# Patient Record
Sex: FEMALE | Race: WHITE | NOT HISPANIC OR LATINO | Employment: OTHER | URBAN - METROPOLITAN AREA
[De-identification: names, ages, dates, MRNs, and addresses within clinical notes are randomized per-mention and may not be internally consistent; named-entity substitution may affect disease eponyms.]

---

## 2019-01-29 ENCOUNTER — TELEPHONE (OUTPATIENT)
Dept: RHEUMATOLOGY | Facility: CLINIC | Age: 74
End: 2019-01-29

## 2019-01-29 NOTE — TELEPHONE ENCOUNTER
Patient's daughter to have Dr. Thompson's office to fax referral. Will call patient's daughter to schedule once received.

## 2019-01-29 NOTE — TELEPHONE ENCOUNTER
----- Message from Elaina Gann sent at 1/29/2019  3:28 PM CST -----  Contact: daughter  Type: Needs Medical Advice    Who Called: Estee-daughter  Best Call Back Number: [phone]  Additional Information: Pt would like to be booked for an appt she has a referral from Dr Thompson's office pt has pain in her back and leg.

## 2019-10-18 ENCOUNTER — TELEPHONE (OUTPATIENT)
Dept: RHEUMATOLOGY | Facility: CLINIC | Age: 74
End: 2019-10-18

## 2019-10-18 NOTE — TELEPHONE ENCOUNTER
Spoke with Estee and scheduled appointment with Dr. Woods for 12.6.19 at 2.30 pm in [place]. Verbalized understanding

## 2019-10-18 NOTE — TELEPHONE ENCOUNTER
----- Message from Aleksandr Mcmahan sent at 10/18/2019  3:47 PM CDT -----  Contact: pt daughter Esete  Type:  New Patient Appointment Request    Caller is requesting a New Patient appointment.     Name of Caller: Estee    Reason for seeing the Provider: diagnosed with Fibromyalgia, arthritis  Does the pt have a referral: no  Best Call Back Number:  [phone]  Additional Information:

## 2020-01-20 PROBLEM — K85.10 ACUTE BILIARY PANCREATITIS: Status: ACTIVE | Noted: 2020-01-20

## 2020-01-20 PROBLEM — E03.9 HYPOTHYROID: Status: ACTIVE | Noted: 2020-01-20

## 2020-01-20 PROBLEM — K81.0 ACUTE CHOLECYSTITIS: Status: ACTIVE | Noted: 2020-01-20

## 2020-01-20 PROBLEM — K85.10 GALL STONE PANCREATITIS: Status: ACTIVE | Noted: 2020-01-20

## 2020-01-27 PROBLEM — J96.01 ACUTE RESPIRATORY FAILURE WITH HYPOXIA: Status: ACTIVE | Noted: 2020-01-27

## 2022-01-31 ENCOUNTER — OFFICE VISIT (OUTPATIENT)
Dept: OPTOMETRY | Facility: CLINIC | Age: 77
End: 2022-01-31
Payer: MEDICARE

## 2022-01-31 DIAGNOSIS — H01.02A SQUAMOUS BLEPHARITIS OF UPPER AND LOWER EYELIDS OF BOTH EYES: Primary | ICD-10-CM

## 2022-01-31 DIAGNOSIS — H01.02B SQUAMOUS BLEPHARITIS OF UPPER AND LOWER EYELIDS OF BOTH EYES: Primary | ICD-10-CM

## 2022-01-31 PROCEDURE — 1160F PR REVIEW ALL MEDS BY PRESCRIBER/CLIN PHARMACIST DOCUMENTED: ICD-10-PCS | Mod: CPTII,S$GLB,, | Performed by: OPTOMETRIST

## 2022-01-31 PROCEDURE — 1159F MED LIST DOCD IN RCRD: CPT | Mod: CPTII,S$GLB,, | Performed by: OPTOMETRIST

## 2022-01-31 PROCEDURE — 1126F PR PAIN SEVERITY QUANTIFIED, NO PAIN PRESENT: ICD-10-PCS | Mod: CPTII,S$GLB,, | Performed by: OPTOMETRIST

## 2022-01-31 PROCEDURE — 92002 INTRM OPH EXAM NEW PATIENT: CPT | Mod: S$GLB,,, | Performed by: OPTOMETRIST

## 2022-01-31 PROCEDURE — 1126F AMNT PAIN NOTED NONE PRSNT: CPT | Mod: CPTII,S$GLB,, | Performed by: OPTOMETRIST

## 2022-01-31 PROCEDURE — 99999 PR PBB SHADOW E&M-EST. PATIENT-LVL II: ICD-10-PCS | Mod: PBBFAC,,, | Performed by: OPTOMETRIST

## 2022-01-31 PROCEDURE — 1159F PR MEDICATION LIST DOCUMENTED IN MEDICAL RECORD: ICD-10-PCS | Mod: CPTII,S$GLB,, | Performed by: OPTOMETRIST

## 2022-01-31 PROCEDURE — 99999 PR PBB SHADOW E&M-EST. PATIENT-LVL II: CPT | Mod: PBBFAC,,, | Performed by: OPTOMETRIST

## 2022-01-31 PROCEDURE — 1101F PT FALLS ASSESS-DOCD LE1/YR: CPT | Mod: CPTII,S$GLB,, | Performed by: OPTOMETRIST

## 2022-01-31 PROCEDURE — 92002 PR EYE EXAM, NEW PATIENT,INTERMED: ICD-10-PCS | Mod: S$GLB,,, | Performed by: OPTOMETRIST

## 2022-01-31 PROCEDURE — 3288F FALL RISK ASSESSMENT DOCD: CPT | Mod: CPTII,S$GLB,, | Performed by: OPTOMETRIST

## 2022-01-31 PROCEDURE — 1101F PR PT FALLS ASSESS DOC 0-1 FALLS W/OUT INJ PAST YR: ICD-10-PCS | Mod: CPTII,S$GLB,, | Performed by: OPTOMETRIST

## 2022-01-31 PROCEDURE — 3288F PR FALLS RISK ASSESSMENT DOCUMENTED: ICD-10-PCS | Mod: CPTII,S$GLB,, | Performed by: OPTOMETRIST

## 2022-01-31 PROCEDURE — 1160F RVW MEDS BY RX/DR IN RCRD: CPT | Mod: CPTII,S$GLB,, | Performed by: OPTOMETRIST

## 2022-01-31 RX ORDER — TOBRAMYCIN 3 MG/ML
1 SOLUTION/ DROPS OPHTHALMIC 4 TIMES DAILY
Qty: 5 ML | Refills: 0 | Status: SHIPPED | OUTPATIENT
Start: 2022-01-31 | End: 2022-02-21

## 2022-01-31 NOTE — PROGRESS NOTES
"HPI     NP/Last eye exam was 2 yrs ago "at some little place in [place]"    + green discharge OU x's 8 months "all during the day" that pt. States   matts up in eyelashes.  + "skim or something I want to wipe off my eyes all the time that I dont   know what it is or how to explain."  Saw Dr. Hudson w/Dr. Thompson office about 6 months ago and they prescribed some   gtts, but pt. Does not know name of them and states she quit using them 3   months ago.  "green discharge has improved some since the end of December."    Denies itchy, pain, redness, or swelling.  Denies any flashes or floaters.    OTC tears BID OU (different brands)    Last edited by Yumiko Cross on 1/31/2022  8:48 AM. (History)            Assessment /Plan     For exam results, see Encounter Report.    Squamous blepharitis of upper and lower eyelids of both eyes  -     tobramycin sulfate 0.3% (TOBREX) 0.3 % ophthalmic solution; Place 1 drop into both eyes 4 (four) times daily. for 7 days  Dispense: 5 mL; Refill: 0      1. Discussed chronicity, start ocusoft lid wipes daily and tobrex drops 4x/day x 1 week, RTC or call if no better in 1 week.                  "

## 2022-10-11 NOTE — PROGRESS NOTES
Assessment /Plan     For exam results, see Encounter Report.    There are no diagnoses linked to this encounter.    Sent in tobrex drop refill per pt request

## 2022-12-27 ENCOUNTER — TELEPHONE (OUTPATIENT)
Dept: OPTOMETRY | Facility: CLINIC | Age: 77
End: 2022-12-27
Payer: MEDICARE

## 2022-12-27 NOTE — TELEPHONE ENCOUNTER
Spoke with pt. Who states FBS OS started yesterday with mild irritation. No changes in VA, flashes, or floaters. Next available appt is 1/3/23 for [facility]. Advised pt. And she sates she wants that appt. Advised pt. If symptoms worsen or new symptoms develop to got to ER.     ----- Message from Yareli Guevara sent at 12/27/2022 10:49 AM CST -----  Contact: [phone]  Type:  Same Day Appointment Request    Caller is requesting a same day appointment.  Caller declined first available appointment listed below.      Name of Caller:  Pt   When is the first available appointment?  Feb   Symptoms:  Feels like something in her left eye   Best Call Back Number:  [phone] (home)   Additional Information:   Pls call back and advise

## 2023-01-03 ENCOUNTER — OFFICE VISIT (OUTPATIENT)
Dept: OPTOMETRY | Facility: CLINIC | Age: 78
End: 2023-01-03
Payer: MEDICARE

## 2023-01-03 DIAGNOSIS — H35.3131 EARLY DRY STAGE NONEXUDATIVE AGE-RELATED MACULAR DEGENERATION OF BOTH EYES: ICD-10-CM

## 2023-01-03 DIAGNOSIS — H01.02B SQUAMOUS BLEPHARITIS OF UPPER AND LOWER EYELIDS OF BOTH EYES: Primary | ICD-10-CM

## 2023-01-03 DIAGNOSIS — H52.4 HYPEROPIA WITH ASTIGMATISM AND PRESBYOPIA, BILATERAL: ICD-10-CM

## 2023-01-03 DIAGNOSIS — H52.203 HYPEROPIA WITH ASTIGMATISM AND PRESBYOPIA, BILATERAL: ICD-10-CM

## 2023-01-03 DIAGNOSIS — H02.889 DRY EYE SYNDROME DUE TO MEIBOMIAN GLAND DYSFUNCTION: ICD-10-CM

## 2023-01-03 DIAGNOSIS — H25.813 COMBINED FORMS OF AGE-RELATED CATARACT, BILATERAL: ICD-10-CM

## 2023-01-03 DIAGNOSIS — H04.129 DRY EYE SYNDROME DUE TO MEIBOMIAN GLAND DYSFUNCTION: ICD-10-CM

## 2023-01-03 DIAGNOSIS — H52.03 HYPEROPIA WITH ASTIGMATISM AND PRESBYOPIA, BILATERAL: ICD-10-CM

## 2023-01-03 DIAGNOSIS — Z13.5 GLAUCOMA SCREENING: ICD-10-CM

## 2023-01-03 DIAGNOSIS — H01.02A SQUAMOUS BLEPHARITIS OF UPPER AND LOWER EYELIDS OF BOTH EYES: Primary | ICD-10-CM

## 2023-01-03 PROCEDURE — 1101F PR PT FALLS ASSESS DOC 0-1 FALLS W/OUT INJ PAST YR: ICD-10-PCS | Mod: CPTII,S$GLB,,

## 2023-01-03 PROCEDURE — 1159F MED LIST DOCD IN RCRD: CPT | Mod: CPTII,S$GLB,,

## 2023-01-03 PROCEDURE — 92134 PR COMPUTERIZED OPHTHALMIC IMAGING RETINA: ICD-10-PCS | Mod: S$GLB,,,

## 2023-01-03 PROCEDURE — 1126F PR PAIN SEVERITY QUANTIFIED, NO PAIN PRESENT: ICD-10-PCS | Mod: CPTII,S$GLB,,

## 2023-01-03 PROCEDURE — 3288F PR FALLS RISK ASSESSMENT DOCUMENTED: ICD-10-PCS | Mod: CPTII,S$GLB,,

## 2023-01-03 PROCEDURE — 1159F PR MEDICATION LIST DOCUMENTED IN MEDICAL RECORD: ICD-10-PCS | Mod: CPTII,S$GLB,,

## 2023-01-03 PROCEDURE — 3288F FALL RISK ASSESSMENT DOCD: CPT | Mod: CPTII,S$GLB,,

## 2023-01-03 PROCEDURE — 99999 PR PBB SHADOW E&M-EST. PATIENT-LVL III: CPT | Mod: PBBFAC,,,

## 2023-01-03 PROCEDURE — 1126F AMNT PAIN NOTED NONE PRSNT: CPT | Mod: CPTII,S$GLB,,

## 2023-01-03 PROCEDURE — 1101F PT FALLS ASSESS-DOCD LE1/YR: CPT | Mod: CPTII,S$GLB,,

## 2023-01-03 PROCEDURE — 92014 COMPRE OPH EXAM EST PT 1/>: CPT | Mod: S$GLB,,,

## 2023-01-03 PROCEDURE — 92014 PR EYE EXAM, EST PATIENT,COMPREHESV: ICD-10-PCS | Mod: S$GLB,,,

## 2023-01-03 PROCEDURE — 92134 CPTRZ OPH DX IMG PST SGM RTA: CPT | Mod: S$GLB,,,

## 2023-01-03 PROCEDURE — 99999 PR PBB SHADOW E&M-EST. PATIENT-LVL III: ICD-10-PCS | Mod: PBBFAC,,,

## 2023-01-03 RX ORDER — METOPROLOL TARTRATE 25 MG/1
25 TABLET, FILM COATED ORAL DAILY
COMMUNITY
Start: 2022-12-05

## 2023-01-03 RX ORDER — NEOMYCIN SULFATE, POLYMYXIN B SULFATE, AND DEXAMETHASONE 3.5; 10000; 1 MG/G; [USP'U]/G; MG/G
OINTMENT OPHTHALMIC 4 TIMES DAILY
Qty: 3.5 G | Refills: 1 | Status: SHIPPED | OUTPATIENT
Start: 2023-01-03 | End: 2023-01-03 | Stop reason: SDUPTHER

## 2023-01-03 RX ORDER — NEOMYCIN SULFATE, POLYMYXIN B SULFATE AND DEXAMETHASONE 3.5; 10000; 1 MG/ML; [USP'U]/ML; MG/ML
1 SUSPENSION/ DROPS OPHTHALMIC 4 TIMES DAILY
Qty: 5 ML | Refills: 1 | Status: SHIPPED | OUTPATIENT
Start: 2023-01-03 | End: 2023-01-13

## 2023-01-03 RX ORDER — LOSARTAN POTASSIUM 50 MG/1
50 TABLET ORAL DAILY
COMMUNITY
Start: 2022-11-09

## 2023-01-03 RX ORDER — CLOPIDOGREL BISULFATE 75 MG/1
75 TABLET ORAL DAILY
COMMUNITY
Start: 2022-12-23

## 2023-01-03 RX ORDER — ROSUVASTATIN CALCIUM 5 MG/1
5 TABLET, COATED ORAL NIGHTLY
COMMUNITY
Start: 2022-12-05

## 2023-01-03 NOTE — PROGRESS NOTES
HPI    New pt here for eye exam and blepharitis f/u.     Pt sts last Tuesday she got something in eye, skin she said. Pt sts this   happens daily and pt does not know what to do anymore. Pt using lid wipes   and Tobramycin BID OU with only slight relief. Pt sts VA OU is stable. Pt   denies flashes/floaters.  Last edited by Yumiko Henry on 1/3/2023  9:10 AM.        ROS    Positive for: Eyes  Negative for: Constitutional, Gastrointestinal, Neurological, Skin,   Genitourinary, Musculoskeletal, HENT, Endocrine, Cardiovascular,   Respiratory, Psychiatric, Allergic/Imm, Heme/Lymph  Last edited by William Morgan, OD on 1/3/2023  9:33 AM.        Assessment /Plan     For exam results, see Encounter Report.    Squamous blepharitis of upper and lower eyelids of both eyes  -     neomycin-polymyxin-dexamethasone (MAXITROL) 3.5 mg/g-10,000 unit/g-0.1 % Oint; Place into both eyes 4 (four) times daily. 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  Longstanding problem for pt. Madarosis, blepharitis, and 2+ SPK OU. Pt currently using Tobramycin gtts BID OU and lid scrubs qday. Recommended pt discontinue Tobramycin and begin Maxitrol gtts QID OU x 10 days. Advised pt to then begin artificial tears QID along with gel gtt at bedtime, continue lid scrubs daily. Recommended warm compresses with digital massage. Ed pt and pt's grandson to call or message if no improvement or worsening of symptoms. Wanted to rx Maxitrol luisana but pt reports difficulties with ointments, prefers not to use.    3. 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 Strong family history per pt - pt already taking an AREDS similar.  Dispensed [employer] for at-home monitoring and instructed on proper use. Baseline mac OCT obtained today. Ok to monitor yearly, sooner if pt symptomatic/noticing changes in grid.    4. Ed pt that cataracts likely contributing to reduced BCVA, cautioned against driving at night. Discussed surgical option, pt defers for now. Monitor yearly for changes.    5. Small CD, IOP WNL. Not suspect. Monitor yearly for changes.    6. No improvement upon refraction today. Ed pt that BCVA reduced due to cataracts and extensive dryness on front surface. Renewed pt's current spec rx and gave copy. [employer], ed pt and grandson to call around to see which optical will accept insurance.    RTC: 1 year for comprehensive exam or sooner prn

## 2024-02-09 PROBLEM — R79.89 ELEVATED TROPONIN: Status: ACTIVE | Noted: 2024-02-09

## 2024-02-09 PROBLEM — M54.2 NECK PAIN WITHOUT INJURY: Status: ACTIVE | Noted: 2024-02-09

## 2024-02-09 PROBLEM — Z71.89 ADVANCE CARE PLANNING: Status: ACTIVE | Noted: 2024-02-09

## 2024-02-09 PROBLEM — M27.62: Status: ACTIVE | Noted: 2024-02-09

## 2024-02-09 PROBLEM — R42 DIZZINESS: Status: ACTIVE | Noted: 2024-02-09

## 2024-02-09 PROBLEM — R11.0 NAUSEA: Status: ACTIVE | Noted: 2024-02-09

## 2024-02-09 PROBLEM — I10 BENIGN ESSENTIAL HYPERTENSION: Status: ACTIVE | Noted: 2024-02-09

## 2024-02-10 PROBLEM — E87.5 HYPERKALEMIA: Status: ACTIVE | Noted: 2024-02-10

## 2024-02-10 PROBLEM — I25.10 CORONARY ARTERY DISEASE: Status: ACTIVE | Noted: 2024-02-10

## 2024-02-11 PROBLEM — J96.11 CHRONIC RESPIRATORY FAILURE WITH HYPOXIA AND HYPERCAPNIA: Status: ACTIVE | Noted: 2020-01-27

## 2024-02-11 PROBLEM — J96.01 ACUTE RESPIRATORY FAILURE WITH HYPOXIA AND HYPERCAPNIA: Chronic | Status: ACTIVE | Noted: 2020-01-27

## 2024-02-11 PROBLEM — J96.02 ACUTE RESPIRATORY FAILURE WITH HYPOXIA AND HYPERCAPNIA: Chronic | Status: ACTIVE | Noted: 2020-01-27

## 2024-02-11 PROBLEM — J96.12 CHRONIC RESPIRATORY FAILURE WITH HYPOXIA AND HYPERCAPNIA: Status: ACTIVE | Noted: 2020-01-27

## 2024-02-12 PROBLEM — J44.9 COPD (CHRONIC OBSTRUCTIVE PULMONARY DISEASE): Status: ACTIVE | Noted: 2024-02-12

## 2024-02-27 ENCOUNTER — OFFICE VISIT (OUTPATIENT)
Dept: OPTOMETRY | Facility: CLINIC | Age: 79
End: 2024-02-27
Payer: MEDICARE

## 2024-02-27 DIAGNOSIS — H01.02A SQUAMOUS BLEPHARITIS OF UPPER AND LOWER EYELIDS OF BOTH EYES: ICD-10-CM

## 2024-02-27 DIAGNOSIS — H35.3131 EARLY DRY STAGE NONEXUDATIVE AGE-RELATED MACULAR DEGENERATION OF BOTH EYES: Primary | ICD-10-CM

## 2024-02-27 DIAGNOSIS — H52.4 HYPEROPIA WITH ASTIGMATISM AND PRESBYOPIA, BILATERAL: ICD-10-CM

## 2024-02-27 DIAGNOSIS — H25.813 COMBINED FORMS OF AGE-RELATED CATARACT, BILATERAL: ICD-10-CM

## 2024-02-27 DIAGNOSIS — H01.02B SQUAMOUS BLEPHARITIS OF UPPER AND LOWER EYELIDS OF BOTH EYES: ICD-10-CM

## 2024-02-27 DIAGNOSIS — H52.03 HYPEROPIA WITH ASTIGMATISM AND PRESBYOPIA, BILATERAL: ICD-10-CM

## 2024-02-27 DIAGNOSIS — H52.203 HYPEROPIA WITH ASTIGMATISM AND PRESBYOPIA, BILATERAL: ICD-10-CM

## 2024-02-27 PROCEDURE — 99214 OFFICE O/P EST MOD 30 MIN: CPT | Mod: S$GLB,,,

## 2024-02-27 PROCEDURE — 92134 CPTRZ OPH DX IMG PST SGM RTA: CPT | Mod: S$GLB,,,

## 2024-02-27 PROCEDURE — 99999 PR PBB SHADOW E&M-EST. PATIENT-LVL III: CPT | Mod: PBBFAC,,,

## 2024-02-27 NOTE — PROGRESS NOTES
HPI    Pt here for annual eye exam for AMD w/ OCT. Last exam- 1 year    Pt sts glasses is not as strong. Pt sts drainage of eye has gotten better.   Pt using AT's PRN. Pt c/o flakes on lashes. Pt denies   flashes/floaters/pain.   Last edited by William Morgan, OD on 2/27/2024  9:36 AM.            Assessment /Plan     For exam results, see Encounter Report.    Early dry stage nonexudative age-related macular degeneration of both eyes  -     Posterior Segment OCT Retina-Both eyes    Squamous blepharitis of upper and lower eyelids of both eyes    Combined forms of age-related cataract, bilateral  -     Ambulatory referral/consult to Ophthalmology; Future; Expected date: 03/05/2024    Hyperopia with astigmatism and presbyopia, bilateral      Mild drusen and RPE mottling OU, largely outside center of fovea. Mac OCT stable to previous with no evidence of CNV OD, OS. Pt has strong family history. Ed pt on all findings and gave OTC vitamin recommendations to begin taking, as pt currently not taking anything (reported last year that she was). Continue to monitor yearly for changes with repeat Mac OCT. Pt to RTC sooner if any changes in vision.   Longstanding with blepharitis, madarosis, and dry eyes. Pt using OTC artificial tears with relief in dryness, complains of constant flaking of eyelids/lashes. Discussed importance of good lid hygiene - pt currently using OcuSoft lid scrubs prn. Recommended a foam cleanser if scrubs too abrasive for pt. Has also tried ointments in the past, but prefers not to use. Monitor yearly for changes, sooner prn.  Visually significant with spoking through visual axis. Ed pt and grandson on findings, on age-related nature of cataracts, and associated symptoms of reduced BCVA and glare. Pt desires surgical consult. Referral placed to main campus, willing to travel.   No improvement/worsening of acuity with new spec rx. Advised against new specs if going forward with cataract eval. Released copy  of spec rx per pt request in case she decides against surgery.      RTC: to ophthalmology for cataract consult; optom for annual

## 2024-05-05 PROBLEM — J98.6 DIAPHRAGM DYSFUNCTION: Status: ACTIVE | Noted: 2024-05-05

## 2024-05-13 PROBLEM — J96.11 CHRONIC RESPIRATORY FAILURE WITH HYPOXIA AND HYPERCAPNIA: Status: RESOLVED | Noted: 2020-01-27 | Resolved: 2024-05-13

## 2024-05-13 PROBLEM — J96.12 CHRONIC RESPIRATORY FAILURE WITH HYPOXIA AND HYPERCAPNIA: Status: RESOLVED | Noted: 2020-01-27 | Resolved: 2024-05-13

## 2025-03-07 ENCOUNTER — OFFICE VISIT (OUTPATIENT)
Dept: OPTOMETRY | Facility: CLINIC | Age: 80
End: 2025-03-07
Payer: MEDICARE

## 2025-03-07 DIAGNOSIS — H35.3131 EARLY DRY STAGE NONEXUDATIVE AGE-RELATED MACULAR DEGENERATION OF BOTH EYES: Primary | ICD-10-CM

## 2025-03-07 DIAGNOSIS — H01.02B SQUAMOUS BLEPHARITIS OF UPPER AND LOWER EYELIDS OF BOTH EYES: ICD-10-CM

## 2025-03-07 DIAGNOSIS — H01.02A SQUAMOUS BLEPHARITIS OF UPPER AND LOWER EYELIDS OF BOTH EYES: ICD-10-CM

## 2025-03-07 DIAGNOSIS — H52.03 HYPEROPIA WITH ASTIGMATISM AND PRESBYOPIA, BILATERAL: ICD-10-CM

## 2025-03-07 DIAGNOSIS — H25.813 COMBINED FORMS OF AGE-RELATED CATARACT, BILATERAL: ICD-10-CM

## 2025-03-07 DIAGNOSIS — H52.203 HYPEROPIA WITH ASTIGMATISM AND PRESBYOPIA, BILATERAL: ICD-10-CM

## 2025-03-07 DIAGNOSIS — H52.4 HYPEROPIA WITH ASTIGMATISM AND PRESBYOPIA, BILATERAL: ICD-10-CM

## 2025-03-07 PROCEDURE — 99999 PR PBB SHADOW E&M-EST. PATIENT-LVL III: CPT | Mod: PBBFAC,,,

## 2025-03-07 RX ORDER — NEOMYCIN SULFATE, POLYMYXIN B SULFATE AND DEXAMETHASONE 3.5; 10000; 1 MG/ML; [USP'U]/ML; MG/ML
SUSPENSION/ DROPS OPHTHALMIC
Qty: 5 ML | Refills: 3 | Status: SHIPPED | OUTPATIENT
Start: 2025-03-07

## 2025-03-07 RX ORDER — EYELID CLEANSER COMBINATION 5
PADS, MEDICATED (EA) TOPICAL
Qty: 30 EACH | Refills: 11 | Status: SHIPPED | OUTPATIENT
Start: 2025-03-07

## 2025-03-07 NOTE — PROGRESS NOTES
"HPI    Eye exam w/OCT Mac w OCT [facility] 02/27/24    Pt denies any changes w distance va, pt wants to know if near va needs to   be increased. Pt still notices flakes from lids and drainage from eye   watering. Pt uses otc eye lid  and AT, but no relief. Pt denies   eye pain, new floaters and flashes.   Last edited by William Morgan, OD on 3/7/2025 12:30 PM.            Assessment /Plan     For exam results, see Encounter Report.    Early dry stage nonexudative age-related macular degeneration of both eyes    Squamous blepharitis of upper and lower eyelids of both eyes  -     neomycin-polymyxin-dexamethasone (MAXITROL) 3.5mg/mL-10,000 unit/mL-0.1 % DrpS; Instill one drop in each eye once daily as needed to control inflammation.  Dispense: 5 mL; Refill: 3  -     eyelid cleanser combination 5 (OCUSOFT LID SCRUB) PadM; Use one pad daily to cleanse the upper and lower lid margins of each eye.  Dispense: 30 each; Refill: 11    Combined forms of age-related cataract, bilateral    Hyperopia with astigmatism and presbyopia, bilateral      Longstanding with scattered hard drusen and RPE mottling OD, OS. Mac OCT today stable to previous with minimal progression OD>OS. No CNV OD, OS. Reviewed findings with pt and emphasized the importance of good compliance with ocular vitamins to prevent progression. Continue to monitor yearly for changes with repeat Mac OCT, sooner prn.  Longstanding with blepharitis, madarosis, dry eye, and complaints of "drainage" of the eyes and flaking of the eyelids. Pt reports lid scrubs being too costly - sent in rx but cautioned pt that it may not be covered. Also discussed option for foam cleansers/baby shampoo if easier/more cost efficient for pt. Advised increased use of OTC artificial tears BID-QID OU daily for relief and sent in Rx for Maxitrol drops for pt to use QD prn for flare-ups - cautioned against chronic use of medication. Ed pt to RTC if any worsening signs or symptoms.  Visually " significant with progression from previous exam. Pt largely asymptomatic, does not desire surgical consult. Ed pt on symptoms of worsening cataracts including reduced BCVA and glare. Monitor yearly for changes.  Minimal to no improvement with new spec rx. Ok to continue with current. Ed pt on minimal changes, fill prn.    RTC: 1 year for comprehensive exam or sooner prn

## 2025-03-24 ENCOUNTER — OFFICE VISIT (OUTPATIENT)
Dept: OPTOMETRY | Facility: CLINIC | Age: 80
End: 2025-03-24
Payer: MEDICARE

## 2025-03-24 ENCOUNTER — TELEPHONE (OUTPATIENT)
Dept: OPTOMETRY | Facility: CLINIC | Age: 80
End: 2025-03-24
Payer: MEDICARE

## 2025-03-24 DIAGNOSIS — H11.32 SUBCONJUNCTIVAL HEMORRHAGE OF LEFT EYE: Primary | ICD-10-CM

## 2025-03-24 DIAGNOSIS — H04.123 BILATERAL DRY EYES: ICD-10-CM

## 2025-03-24 DIAGNOSIS — H01.02B SQUAMOUS BLEPHARITIS OF UPPER AND LOWER EYELIDS OF BOTH EYES: ICD-10-CM

## 2025-03-24 DIAGNOSIS — H01.02A SQUAMOUS BLEPHARITIS OF UPPER AND LOWER EYELIDS OF BOTH EYES: ICD-10-CM

## 2025-03-24 PROCEDURE — 99213 OFFICE O/P EST LOW 20 MIN: CPT | Mod: S$GLB,,, | Performed by: OPTOMETRIST

## 2025-03-24 PROCEDURE — 1101F PT FALLS ASSESS-DOCD LE1/YR: CPT | Mod: CPTII,S$GLB,, | Performed by: OPTOMETRIST

## 2025-03-24 PROCEDURE — 3288F FALL RISK ASSESSMENT DOCD: CPT | Mod: CPTII,S$GLB,, | Performed by: OPTOMETRIST

## 2025-03-24 PROCEDURE — 99999 PR PBB SHADOW E&M-EST. PATIENT-LVL III: CPT | Mod: PBBFAC,,, | Performed by: OPTOMETRIST

## 2025-03-24 PROCEDURE — 1159F MED LIST DOCD IN RCRD: CPT | Mod: CPTII,S$GLB,, | Performed by: OPTOMETRIST

## 2025-03-24 PROCEDURE — 1126F AMNT PAIN NOTED NONE PRSNT: CPT | Mod: CPTII,S$GLB,, | Performed by: OPTOMETRIST

## 2025-03-24 PROCEDURE — 1160F RVW MEDS BY RX/DR IN RCRD: CPT | Mod: CPTII,S$GLB,, | Performed by: OPTOMETRIST

## 2025-03-24 NOTE — PROGRESS NOTES
HPI    Urgent care pt here for FBS OS DLS- 03/07/25    Pt complains of red, scratchy with FBS OS X 1 day.   Pt sts she has been using Maxitrol as directed and clear eyes with no   relief.   Pt was using lid scrubs with no relief.  Last edited by Bhavya Griffith on 3/24/2025 10:13 AM.            Assessment /Plan     For exam results, see Encounter Report.    Subconjunctival hemorrhage of left eye    Squamous blepharitis of upper and lower eyelids of both eyes    Bilateral dry eyes      Resolving, caused extra redness pt noticed, Monitor condition. Patient to report any changes. RTC 1 year recheck.  2. Discussed chronicity, and inability to cure, ocusoft too expensive, ok for baby shampoo lid scrubs daily, Monitor condition.  3. Recommend gel drops. 1 drop 2x per day. Chronicity of disease and treatment discussed. Small temporal dellen left eye.